# Patient Record
Sex: MALE | Race: WHITE | NOT HISPANIC OR LATINO | URBAN - METROPOLITAN AREA
[De-identification: names, ages, dates, MRNs, and addresses within clinical notes are randomized per-mention and may not be internally consistent; named-entity substitution may affect disease eponyms.]

---

## 2020-04-01 PROBLEM — Z00.00 ENCOUNTER FOR PREVENTIVE HEALTH EXAMINATION: Status: ACTIVE | Noted: 2020-04-01

## 2020-04-02 ENCOUNTER — OUTPATIENT (OUTPATIENT)
Dept: OUTPATIENT SERVICES | Facility: HOSPITAL | Age: 50
LOS: 1 days | Discharge: HOME | End: 2020-04-02
Payer: COMMERCIAL

## 2020-04-02 DIAGNOSIS — C22.9 MALIGNANT NEOPLASM OF LIVER, NOT SPECIFIED AS PRIMARY OR SECONDARY: ICD-10-CM

## 2020-04-04 ENCOUNTER — TRANSCRIPTION ENCOUNTER (OUTPATIENT)
Age: 50
End: 2020-04-04

## 2020-04-07 ENCOUNTER — APPOINTMENT (OUTPATIENT)
Age: 50
End: 2020-04-07

## 2020-04-09 ENCOUNTER — RESULT REVIEW (OUTPATIENT)
Age: 50
End: 2020-04-09

## 2020-04-09 PROCEDURE — 88321 CONSLTJ&REPRT SLD PREP ELSWR: CPT

## 2020-04-14 ENCOUNTER — APPOINTMENT (OUTPATIENT)
Age: 50
End: 2020-04-14
Payer: COMMERCIAL

## 2020-04-14 DIAGNOSIS — Z80.0 FAMILY HISTORY OF MALIGNANT NEOPLASM OF DIGESTIVE ORGANS: ICD-10-CM

## 2020-04-14 DIAGNOSIS — Z63.5 DISRUPTION OF FAMILY BY SEPARATION AND DIVORCE: ICD-10-CM

## 2020-04-14 DIAGNOSIS — Z91.89 OTHER SPECIFIED PERSONAL RISK FACTORS, NOT ELSEWHERE CLASSIFIED: ICD-10-CM

## 2020-04-14 DIAGNOSIS — C18.9 MALIGNANT NEOPLASM OF COLON, UNSPECIFIED: ICD-10-CM

## 2020-04-14 DIAGNOSIS — I10 ESSENTIAL (PRIMARY) HYPERTENSION: ICD-10-CM

## 2020-04-14 DIAGNOSIS — Z80.1 FAMILY HISTORY OF MALIGNANT NEOPLASM OF TRACHEA, BRONCHUS AND LUNG: ICD-10-CM

## 2020-04-14 DIAGNOSIS — Z78.9 OTHER SPECIFIED HEALTH STATUS: ICD-10-CM

## 2020-04-14 PROCEDURE — 99203 OFFICE O/P NEW LOW 30 MIN: CPT | Mod: 95

## 2020-04-14 SDOH — SOCIAL STABILITY - SOCIAL INSECURITY: DISRUPTION OF FAMILY BY SEPARATION AND DIVORCE: Z63.5

## 2020-04-15 ENCOUNTER — APPOINTMENT (OUTPATIENT)
Age: 50
End: 2020-04-15

## 2020-05-07 PROBLEM — Z78.9 SOCIAL ALCOHOL USE: Status: ACTIVE | Noted: 2020-05-07

## 2020-05-07 PROBLEM — Z63.5 DIVORCED: Status: ACTIVE | Noted: 2020-05-07

## 2020-05-07 PROBLEM — Z80.0 FAMILY HISTORY OF LIVER CANCER: Status: ACTIVE | Noted: 2020-05-07

## 2020-05-07 PROBLEM — I10 HTN (HYPERTENSION), BENIGN: Status: RESOLVED | Noted: 2020-05-07 | Resolved: 2020-05-07

## 2020-05-07 PROBLEM — Z80.1 FAMILY HISTORY OF LUNG CANCER: Status: ACTIVE | Noted: 2020-05-07

## 2020-05-07 PROBLEM — Z91.89 QUIT USING TOBACCO IN REMOTE PAST: Status: ACTIVE | Noted: 2020-05-07

## 2020-05-07 PROBLEM — C18.9 STAGE IV CARCINOMA OF COLON: Status: ACTIVE | Noted: 2020-05-07

## 2020-05-07 NOTE — HISTORY OF PRESENT ILLNESS
[AJCC Stage: ____] : AJCC Stage: [unfilled] [Disease: _____________________] : Disease: [unfilled] [de-identified] : 49 M presents for 2nd opinion regarding a diagnosis of metastatic colon cancer. \par \par Juan reports that he noted blood in the stool in beginning of March. This was a/w RUQ and 20 lb weight loss. Referred to GI who ordered CT A/P. This showed innumerable liver mets, questionable sclerotic lesion in R superior ramus, and R acetabulum, sigmoid colon thickening. Was referred to medical oncology, Dr. Clement. \par \par 3/26/20: PET/CT: hypermetabolic midline sigmoid mass and adjacent adenopathy, b/l internal mammary chain met LN, liver mets, possible involvement of bladder, pancreas and IVC\par \par 3/30/20: Liver bx: adenocarcinoma \par \par \par \par \par \par  [de-identified] : mod-poorly differentiated adenocarcinoma  [de-identified] : + constipated. 2 days ago had loose stools. + bloated, not passing gas often. no n/v. Good appetite.  \par Never had colo\par

## 2020-05-07 NOTE — REVIEW OF SYSTEMS
[Recent Change In Weight] : ~T recent weight change [Constipation] : constipation [Negative] : Heme/Lymph [FreeTextEntry7] : + bloated

## 2020-05-07 NOTE — PHYSICAL EXAM
[Fully active, able to carry on all pre-disease performance without restriction] : Status 0 - Fully active, able to carry on all pre-disease performance without restriction [Normal] : affect appropriate [de-identified] : normal respiratory pattern

## 2020-05-07 NOTE — REASON FOR VISIT
[Initial Consultation] : an initial consultation [Family Member] : family member [FreeTextEntry2] : Metastatic colon cancer

## 2020-06-25 ENCOUNTER — EMERGENCY (EMERGENCY)
Facility: HOSPITAL | Age: 50
LOS: 1 days | Discharge: HOME | End: 2020-06-25
Attending: EMERGENCY MEDICINE | Admitting: EMERGENCY MEDICINE
Payer: COMMERCIAL

## 2020-06-25 VITALS
SYSTOLIC BLOOD PRESSURE: 105 MMHG | DIASTOLIC BLOOD PRESSURE: 60 MMHG | HEART RATE: 74 BPM | RESPIRATION RATE: 16 BRPM | OXYGEN SATURATION: 98 %

## 2020-06-25 VITALS
RESPIRATION RATE: 18 BRPM | OXYGEN SATURATION: 94 % | SYSTOLIC BLOOD PRESSURE: 99 MMHG | HEART RATE: 126 BPM | TEMPERATURE: 98 F | DIASTOLIC BLOOD PRESSURE: 73 MMHG

## 2020-06-25 PROCEDURE — 99284 EMERGENCY DEPT VISIT MOD MDM: CPT | Mod: 25

## 2020-06-25 PROCEDURE — 12013 RPR F/E/E/N/L/M 2.6-5.0 CM: CPT

## 2020-06-25 PROCEDURE — 70450 CT HEAD/BRAIN W/O DYE: CPT | Mod: 26

## 2020-06-25 PROCEDURE — 70450 CT HEAD/BRAIN W/O DYE: CPT

## 2020-06-25 NOTE — ED ADULT TRIAGE NOTE - CHIEF COMPLAINT QUOTE
slip off bed on home hospice/ laceration to head, no LOC   hx of colon ca with mets to liver  received melatonin by family around midnight per EMS

## 2020-06-25 NOTE — ED ADULT NURSE NOTE - OBJECTIVE STATEMENT
48 y/o M patient presents to ED via EMS from home hospice c/o fall today. As per EMS patient fell out of bed onto floor. EMS reports patient received melatonin at midnight from family. Patient denies LOC. Patient alert and drowsy but arousable. lungs CTA. laceration noted over left eyebrow. abrasion noted to bridge of nose. wound noted to right gluteal. persistent redness noted to b/l gluteals. abdomen soft. Patient denies HA, dizziness, SOB, chest pain, abdominal pain, N/V/D. Safety and comfort measures provided and maintained. MD bedside. 50 y/o M patient presents to ED via EMS from home hospice c/o fall today. As per EMS patient fell out of bed onto floor. EMS reports patient received melatonin at midnight from family. Patient denies LOC. Patient disoriented to time and drowsy but arousable. lungs CTA. port noted to right chest wall. laceration noted over left eyebrow. abrasion noted to bridge of nose. wound noted to right gluteal. persistent redness noted to b/l gluteals. abdomen soft. Patient denies HA, dizziness, SOB, chest pain, abdominal pain, N/V/D. Safety and comfort measures provided and maintained. MD bedside.

## 2020-06-25 NOTE — ED PROVIDER NOTE - PATIENT PORTAL LINK FT
You can access the FollowMyHealth Patient Portal offered by [facility] by registering at the following website: [email or web address]. By joining [facility]’s FollowMyHealth portal, you will also be able to view your health information using other applications (apps) compatible with our system.

## 2020-06-25 NOTE — ED PROVIDER NOTE - OBJECTIVE STATEMENT
50yo male bib ems from home hospice s/p fall out of bed, pt fell onto tile floor, no LOC< pt denies headache, +laceration over left eye, no dizziness, pt is drowsy but arousable, able to give hx

## 2020-06-25 NOTE — ED PROVIDER NOTE - CARE PLAN
Principal Discharge DX:	Laceration of eyebrow without complication Principal Discharge DX:	Laceration of eyebrow without complication  Secondary Diagnosis:	Head injury due to trauma

## 2020-06-25 NOTE — ED ADULT NURSE REASSESSMENT NOTE - NS ED NURSE REASSESS COMMENT FT1
Received the patient in the Er at the time of change of shift. Patient is alert and responsive. Patient got discharge by Dr. Rahman. Pending for transport back home.

## 2020-06-25 NOTE — ED ADULT NURSE NOTE - NS ED NURSE LEVEL OF CONSCIOUSNESS MENTAL STATUS
Drowsy/Responds to verbal/Responds to pain/Alert/Awake Responds to verbal/Responds to pain/Awake/Confused/Drowsy

## 2021-10-19 NOTE — ED ADULT NURSE NOTE - DOES PATIENT HAVE ADVANCE DIRECTIVE
unk Mauc Instructions: By selecting yes to the question below the MAUC number will be added into the note.  This will be calculated automatically based on the diagnosis chosen, the size entered, the body zone selected (H,M,L) and the specific indications you chose. You will also have the option to override the Mohs AUC if you disagree with the automatically calculated number and this option is found in the Case Summary tab.